# Patient Record
Sex: MALE | Race: WHITE | HISPANIC OR LATINO | Employment: FULL TIME | URBAN - METROPOLITAN AREA
[De-identification: names, ages, dates, MRNs, and addresses within clinical notes are randomized per-mention and may not be internally consistent; named-entity substitution may affect disease eponyms.]

---
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
## 2020-04-08 ENCOUNTER — OFFICE VISIT (OUTPATIENT)
Dept: URGENT CARE | Facility: PHYSICIAN GROUP | Age: 20
End: 2020-04-08
Payer: COMMERCIAL

## 2020-04-08 VITALS
HEART RATE: 85 BPM | DIASTOLIC BLOOD PRESSURE: 58 MMHG | RESPIRATION RATE: 20 BRPM | WEIGHT: 170.8 LBS | BODY MASS INDEX: 25.88 KG/M2 | OXYGEN SATURATION: 98 % | SYSTOLIC BLOOD PRESSURE: 96 MMHG | HEIGHT: 68 IN | TEMPERATURE: 98.5 F

## 2020-04-08 DIAGNOSIS — R04.0 EPISTAXIS: ICD-10-CM

## 2020-04-08 PROCEDURE — 30901 CONTROL OF NOSEBLEED: CPT | Mod: RT | Performed by: FAMILY MEDICINE

## 2020-04-08 ASSESSMENT — ENCOUNTER SYMPTOMS
BRUISES/BLEEDS EASILY: 0
SINUS PAIN: 0

## 2020-04-08 NOTE — PROGRESS NOTES
"Subjective:      Nomna Rg is a 19 y.o. male who presents with Nose Bleed (random bloody noses, stops after 2 or 3 minutes, x1 month)            1 month intermittent epistaxis from right nostril.  No clear trigger or trauma.  No known hemophilia.  He does not bleed from any other areas.  He has been using Flonase recently but notes the nosebleed started before that.  No current bleeding.      Review of Systems   HENT: Negative for congestion and sinus pain.    Endo/Heme/Allergies: Does not bruise/bleed easily.          Objective:     BP (!) 96/58 (BP Location: Left arm, Patient Position: Sitting, BP Cuff Size: Adult)   Pulse 85   Temp 36.9 °C (98.5 °F) (Temporal)   Resp 20   Ht 1.727 m (5' 8\")   Wt 77.5 kg (170 lb 12.8 oz)   SpO2 98%   BMI 25.97 kg/m²      Physical Exam  Constitutional:       Appearance: Normal appearance.   HENT:      Head: Normocephalic and atraumatic.      Nose:      Comments: Right nare: Bleeding site anterior septum identified.  No active bleeding.  Cauterized with silver nitrate.  Patient tolerated well.  Skin:     General: Skin is warm and dry.   Neurological:      Mental Status: He is alert.                 Assessment/Plan:     1. Epistaxis       Differential diagnosis, natural history, supportive care, and indications for immediate follow-up discussed at length.     Cauterized.  Recommend stop Flonase.  He may use OTC Ponaris.  Follow-up PRN.  "

## 2021-11-13 ENCOUNTER — NON-PROVIDER VISIT (OUTPATIENT)
Dept: URGENT CARE | Facility: PHYSICIAN GROUP | Age: 21
End: 2021-11-13

## 2021-11-13 DIAGNOSIS — Z02.1 PRE-EMPLOYMENT DRUG SCREENING: ICD-10-CM

## 2021-11-13 LAB
AMP AMPHETAMINE: NORMAL
COC COCAINE: NORMAL
INT CON NEG: NORMAL
INT CON POS: NORMAL
MET METHAMPHETAMINES: NORMAL
OPI OPIATES: NORMAL
PCP PHENCYCLIDINE: NORMAL
POC DRUG COMMENT 753798-OCCUPATIONAL HEALTH: NORMAL
THC: NORMAL

## 2021-11-13 PROCEDURE — 80305 DRUG TEST PRSMV DIR OPT OBS: CPT | Performed by: PHYSICIAN ASSISTANT

## 2023-03-31 ENCOUNTER — NON-PROVIDER VISIT (OUTPATIENT)
Dept: URGENT CARE | Facility: PHYSICIAN GROUP | Age: 23
End: 2023-03-31

## 2023-03-31 DIAGNOSIS — Z02.1 PRE-EMPLOYMENT DRUG SCREENING: ICD-10-CM

## 2023-03-31 LAB
AMP AMPHETAMINE: NORMAL
COC COCAINE: NORMAL
INT CON NEG: NEGATIVE
INT CON POS: NEGATIVE
MET METHAMPHETAMINES: NORMAL
OPI OPIATES: NORMAL
PCP PHENCYCLIDINE: NORMAL
POC DRUG COMMENT 753798-OCCUPATIONAL HEALTH: NEGATIVE
THC: NORMAL

## 2023-03-31 PROCEDURE — 80305 DRUG TEST PRSMV DIR OPT OBS: CPT | Performed by: PHYSICIAN ASSISTANT
